# Patient Record
(demographics unavailable — no encounter records)

---

## 2024-11-19 NOTE — HISTORY OF PRESENT ILLNESS
[Patient reported mammogram was normal] : Patient reported mammogram was normal [Patient reported PAP Smear was normal] : Patient reported PAP Smear was normal [Patient reported colonoscopy was normal] : Patient reported colonoscopy was normal [FreeTextEntry1] : 49 y/o  LMP 24 presents for annual GYN exam. Reports menses q3-4 months apart. Had 3 UTIs in the last year. No current symptoms.   Hysteroscopic Mirena iud removal 2023 Pt is an [place] as a New  for HS. ObHx: MAB,  x2 GYNHx: h/o painful periods FamHx: lung CA (uncle) meds:Advil, Tylenol prn [Mammogramdate] : 8/29/23 [TextBox_19] : BIRADS 1 [PapSmeardate] : 1/26/23 [ColonoscopyDate] : 2023 [Patient refuses STI testing] : Patient refuses STI testing

## 2024-11-19 NOTE — PLAN
[FreeTextEntry1] : Routine GYN Exam -Discussed and reviewed importance of monthly BSE -Declines STI testing, importance safe sexual practices discussed -Pap/HPV test collected and sent at todays visit -pt report normal mammogram in the last 2 weeks -Osteoporosis prevention; recc. vitd/Calcium supplementation and WBE to maintain bone density; start DEXA >65 -f/u PCP for recommended HCM, vaccinations and CA screening -referred to urlogy for recurrent UTIs.  rto 1 yr or sooner as needed.